# Patient Record
Sex: FEMALE | Race: WHITE | NOT HISPANIC OR LATINO | Employment: PART TIME | URBAN - NONMETROPOLITAN AREA
[De-identification: names, ages, dates, MRNs, and addresses within clinical notes are randomized per-mention and may not be internally consistent; named-entity substitution may affect disease eponyms.]

---

## 2018-03-20 ENCOUNTER — OFFICE VISIT (OUTPATIENT)
Dept: PSYCHIATRY | Facility: CLINIC | Age: 63
End: 2018-03-20

## 2018-03-20 DIAGNOSIS — F33.1 MAJOR DEPRESSIVE DISORDER, RECURRENT EPISODE, MODERATE (HCC): Primary | ICD-10-CM

## 2018-03-20 DIAGNOSIS — F41.1 GENERALIZED ANXIETY DISORDER: ICD-10-CM

## 2018-03-20 PROCEDURE — 90791 PSYCH DIAGNOSTIC EVALUATION: CPT | Performed by: SOCIAL WORKER

## 2018-03-20 NOTE — PROGRESS NOTES
IDENTIFYING INFORMATION:   The patient is a 62 y.o. female who is here today for initial appointment from 12:30 PM to 1:30 PM.     CHIEF COMPLIANT:  Patient has had increased stress, depression, and anxiety over the last year since her son and his girlfriend has been living with her.  Her son is on an ankle monitor waiting for his trial in May, 2018.  Patient is not sure of the charges but he has been addicted to methamphetamine.  The couple fights, scream and holler at each other, talk badly to the patient, and at times patient is afraid of her son.  Neither patient's son nor his girlfriend is working and do not contribute to expenses.  She does not think her son is currently using because of the ankle monitor.  Her son has lost his family, home, and cars and has not worked since Christmas 2017.  The couple also lost their baby who was born December 13, 2017.  Patient stayed with her cousin for a while because of the stress then moved back to her home and has been with her cousin again for the last 3 nights.    HPI:  Patient's primary care physician has prescribed Cymbalta for depression and increased the dosage from 1 a day to 1 in the morning and 1 in the evening.  She reports depressed mood, loss of interest in things she used to enjoy, some loss of motivation.  Patient is not able to concentrate or make decisions, feels hopeless and helpless and worthless, and has passive suicidal thoughts.  She states she would never act on them.  Patient worries constantly about bills and how she was going to survive while trying to support her son and his girlfriend.  She is not able to sleep.  She feels restless and on edge, is irritable and sometimes has a lot of tension in her body.  She reports the Cymbalta helps but is not enough for her to manage her stress at this time      PAST PSYCHIATRIC HISTORY:  No previous psychiatric history      SUBSTANCE ABUSE HISTORY: No history of substance abuse      MEDICAL HISTORY:   Patient has diabetes, thyroid problems, stomach problems.      CURRENT MEDICATIONS:  Current Outpatient Prescriptions   Medication Sig Dispense Refill   • TANZEUM 30 MG pen-injector inject 30 mg subcutaneously every week 12 each 3     No current facility-administered medications for this visit.          FAMILY HISTORY: No family history of mental health issues.   son was an addict.  Another son is addicted to drugs.      SOCIAL HISTORY:  Patient reports she was raised by both parents until the fifth grade when they .  She chose to live with her father but since he worked out of town she stayed with her sister and brother-in-law frequently.  Patient has 2 sisters and 3 brothers and they all got along well.  Her mother remained involved with her while she was growing up.  Patient reports she didn't like school and didn't do real well.  She was held back twice and quit in the ninth grade.  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    Patient  her  who  of a heart attack in .  Tthey had 2 children together.  One son  in  in a car accident.  He had been sober for about a month when he had the accident.  Her other son and his girlfriend live with the patient currently.  Patient reports she has a good friend system and a lot of support.  She is a member of [facility] [employer].  When patient turned 59 years old she began receiving 's benefits on her first 's Social Security account.  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      MENTAL STATUS EXAM:   Hygiene:   good  Cooperation:  Cooperative  Eye Contact:  Fair  Psychomotor Behavior:  Appropriate  Affect:  Tearful  Hopelessness: 7  Speech:  Normal  Thought Process:  Goal directed and Linear  Thought Content:  Normal  Suicidal:  Passive suicidal thoughts  Homicidal:  None  Hallucinations:  None  Delusion:  None  Memory:   Intact  Orientation:  Person, Place, Time and Situation  Reliability:  good  Insight:  Good  Judgement:  Good  Impulse Control:  Good  Physical/Medical Issues:  Diabetes, thyroid problems, stomach problems    PROBLEM LIST:  Depression and anxiety      STRENGTHS:  Stable income, stable place to live, strong support system       WEAKNESSES:  Limited coping skills, unable to set limits with son        SHORT-TERM GOALS: Patient will be compliant with clinic appointments.  Patient will be engaged in therapy, medication compliant with minimal side effects. Patient  will report decrease of symptoms and frequency.    LONG-TERM GOALS: Patient will have cessation of symptoms and be able to function at optimal levels without continued treatment.     DIAGNOSIS:     ICD-10-CM ICD-9-CM   1. Major depressive disorder, recurrent episode, moderate F33.1 296.32   2. Generalized anxiety disorder F41.1 300.02         PLAN:   Patient will continue in monthly individual outpatient treatment and pharmacotherapy as scheduled.        The patient was instructed to contact the clinic, call 911, or present to the nearest emergency room if crisis occurs.         Ceci Pulliam LCSW

## 2021-03-11 ENCOUNTER — IMMUNIZATION (OUTPATIENT)
Dept: VACCINE CLINIC | Facility: HOSPITAL | Age: 66
End: 2021-03-11

## 2021-03-11 PROCEDURE — 91300 HC SARSCOV02 VAC 30MCG/0.3ML IM: CPT | Performed by: INTERNAL MEDICINE

## 2021-03-11 PROCEDURE — 0001A: CPT | Performed by: INTERNAL MEDICINE

## 2021-04-01 ENCOUNTER — IMMUNIZATION (OUTPATIENT)
Dept: VACCINE CLINIC | Facility: HOSPITAL | Age: 66
End: 2021-04-01

## 2021-04-01 PROCEDURE — 0002A: CPT | Performed by: INTERNAL MEDICINE

## 2021-04-01 PROCEDURE — 91300 HC SARSCOV02 VAC 30MCG/0.3ML IM: CPT | Performed by: INTERNAL MEDICINE
